# Patient Record
Sex: MALE | Race: BLACK OR AFRICAN AMERICAN | NOT HISPANIC OR LATINO | Employment: UNEMPLOYED | URBAN - METROPOLITAN AREA
[De-identification: names, ages, dates, MRNs, and addresses within clinical notes are randomized per-mention and may not be internally consistent; named-entity substitution may affect disease eponyms.]

---

## 2022-09-01 VITALS
RESPIRATION RATE: 20 BRPM | DIASTOLIC BLOOD PRESSURE: 62 MMHG | OXYGEN SATURATION: 99 % | HEART RATE: 75 BPM | TEMPERATURE: 98.7 F | BODY MASS INDEX: 22.53 KG/M2 | WEIGHT: 185 LBS | HEIGHT: 76 IN | SYSTOLIC BLOOD PRESSURE: 114 MMHG

## 2022-09-01 PROCEDURE — 99281 EMR DPT VST MAYX REQ PHY/QHP: CPT

## 2022-09-02 ENCOUNTER — HOSPITAL ENCOUNTER (EMERGENCY)
Facility: HOSPITAL | Age: 22
Discharge: HOME | End: 2022-09-02
Attending: EMERGENCY MEDICINE | Admitting: EMERGENCY MEDICINE

## 2022-09-02 DIAGNOSIS — S01.112A LACERATION OF LEFT EYEBROW, INITIAL ENCOUNTER: Primary | ICD-10-CM
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
## 2022-09-02 DIAGNOSIS — S09.90XA MINOR HEAD INJURY, INITIAL ENCOUNTER: ICD-10-CM

## 2022-09-02 RX ORDER — LIDOCAINE HYDROCHLORIDE 10 MG/ML
10 INJECTION, SOLUTION EPIDURAL; INFILTRATION; INTRACAUDAL; PERINEURAL ONCE
Status: DISCONTINUED | OUTPATIENT
Start: 2022-09-02 | End: 2022-09-02 | Stop reason: HOSPADM

## 2022-09-02 NOTE — DISCHARGE INSTRUCTIONS
Patient tolerated suture placement well.  I applied #6 sutures to the left eyebrow.  Recommend suture removal in 7 days.  Tylenol/ibuprofen every 4-6 hours as needed for pain.  Keep wound clean and dry with soap and water.  Recommend close PCP follow-up for recheck as needed.  Return to the ER if worsening symptoms of redness, warmth, or drainage.

## 2022-09-02 NOTE — ED PROVIDER NOTES
Subjective   This is a healthy 22-year-old male that presents the ER with laceration to the left eyebrow.  Patient was playing basketball and accidentally head butted another player.  He sustained a laceration to the left eyebrow, but he denies any loss of consciousness.  He denies visual changes or nausea/vomiting.  He denies any dizziness.  Tetanus status is up-to-date.  No other complaints of pain at this time.      History provided by:  Patient and parent  Facial Laceration  Location:  Face  Facial laceration location:  L eyebrow  Length:  1.5cm  Depth:  Cutaneous  Quality: straight    Time since incident:  30 minutes  Injury mechanism: Patient playing basketball and struck another player's head.  Pain details:     Quality:  Throbbing    Severity:  Mild    Timing:  Constant    Progression:  Unchanged  Foreign body present:  No foreign bodies  Relieved by:  Nothing  Worsened by:  Nothing  Ineffective treatments:  None tried  Tetanus status:  Up to date  Associated symptoms: swelling    Associated symptoms: no fever, no focal weakness, no numbness, no rash, no redness and no streaking        Review of Systems   Constitutional: Negative.  Negative for fever.   Eyes: Negative.  Negative for visual disturbance.   Gastrointestinal: Negative.  Negative for nausea and vomiting.   Skin: Positive for wound (Laceration to left eyebrow). Negative for rash.   Neurological: Negative.  Negative for dizziness, focal weakness, syncope and headaches.        No LOC.   All other systems reviewed and are negative.      No past medical history on file.    No Known Allergies    No past surgical history on file.    No family history on file.    Social History     Socioeconomic History   • Marital status: Single           Objective   Physical Exam  Vitals and nursing note reviewed.   Constitutional:       General: He is not in acute distress.     Appearance: Normal appearance.      Comments: Pleasant and talkative.  No acute sign of  pain or distress.   HENT:      Head: Normocephalic. Laceration present.        Right Ear: Tympanic membrane normal.      Left Ear: Tympanic membrane normal.      Nose: Nose normal.      Mouth/Throat:      Mouth: Mucous membranes are moist.      Pharynx: Oropharynx is clear.   Eyes:      Extraocular Movements: Extraocular movements intact.      Right eye: Normal extraocular motion and no nystagmus.      Left eye: Normal extraocular motion and no nystagmus.      Conjunctiva/sclera: Conjunctivae normal.      Pupils: Pupils are equal, round, and reactive to light.   Cardiovascular:      Rate and Rhythm: Normal rate and regular rhythm.  No extrasystoles are present.     Pulses: Normal pulses.      Heart sounds: Normal heart sounds.      Comments: Regular rate and rhythm.  No ectopy.  Pulmonary:      Effort: Pulmonary effort is normal.      Breath sounds: Normal breath sounds.      Comments: Lungs are clear to auscultation bilaterally  Abdominal:      General: Bowel sounds are normal.      Palpations: Abdomen is soft.   Musculoskeletal:         General: Normal range of motion.      Cervical back: Normal range of motion and neck supple. No spinous process tenderness or muscular tenderness.   Skin:     General: Skin is warm and dry.      Findings: Laceration present.      Comments: See HEENT for details on laceration   Neurological:      General: No focal deficit present.      Mental Status: He is alert.      Cranial Nerves: Cranial nerves are intact.      Sensory: Sensation is intact.      Motor: Motor function is intact.      Coordination: Coordination is intact.      Comments: Neuro intact and nonfocal.   Psychiatric:         Mood and Affect: Mood normal.         Speech: Speech normal.         Behavior: Behavior normal. Behavior is cooperative.         Thought Content: Thought content normal.         Cognition and Memory: Cognition normal.         Judgment: Judgment normal.      Comments: Pleasant and talkative.  Smiling  and laughing.         Laceration Repair    Date/Time: 9/2/2022 3:16 AM  Performed by: Abbie Bradley PA-C  Authorized by: Shilo Chowdhury MD     Consent:     Consent obtained:  Verbal    Consent given by:  Patient    Risks, benefits, and alternatives were discussed: yes      Risks discussed:  Infection, poor cosmetic result and poor wound healing  Universal protocol:     Patient identity confirmed:  Verbally with patient  Anesthesia:     Anesthesia method:  Local infiltration    Local anesthetic:  Lidocaine 1% w/o epi  Laceration details:     Location:  Face    Face location:  L eyebrow    Length (cm):  1.5    Depth (mm):  3  Pre-procedure details:     Preparation:  Patient was prepped and draped in usual sterile fashion  Exploration:     Limited defect created (wound extended): no      Imaging outcome: foreign body not noted      Wound extent: no foreign bodies/material noted      Contaminated: no    Treatment:     Area cleansed with:  Povidone-iodine and saline    Amount of cleaning:  Standard    Irrigation solution:  Sterile saline    Irrigation method: 4x4s.    Visualized foreign bodies/material removed: no      Debridement:  None    Undermining:  Minimal    Scar revision: no    Skin repair:     Repair method:  Sutures    Suture size:  6-0    Suture material:  Nylon    Suture technique:  Simple interrupted    Number of sutures:  6  Approximation:     Approximation:  Close  Repair type:     Repair type:  Complex  Post-procedure details:     Dressing:  Open (no dressing)    Procedure completion:  Tolerated well, no immediate complications               ED Course  ED Course as of 09/02/22 0319   Fri Sep 02, 2022   0318 Patient tolerated suture placement well.  I applied #6 sutures and recommend suture removal in 7 days.  Tetanus status is up-to-date.  Keep wound clean with soap and water and open to the air is much as possible.  Return to the ER if any worsening symptoms of redness, warmth, or drainage. [FC]  "     ED Course User Index  [FC] Abbie Bradley PA-C                 No results found for this or any previous visit (from the past 24 hour(s)).  Note: In addition to lab results from this visit, the labs listed above may include labs taken at another facility or during a different encounter within the last 24 hours. Please correlate lab times with ED admission and discharge times for further clarification of the services performed during this visit.    No orders to display     Vitals:    09/01/22 2201   BP: (!) 114/62   BP Location: Right arm   Patient Position: Sitting   Pulse: (!) 75   Resp: (!) 20   Temp: 98.7 °F (37.1 °C)   TempSrc: Oral   SpO2: 99%   Weight: 83.9 kg (185 lb)   Height: 193 cm (76\")     Medications   lidocaine PF 1% (XYLOCAINE) injection 10 mL (0 mL Infiltration Hold 9/2/22 0319)     ECG/EMG Results (last 24 hours)     ** No results found for the last 24 hours. **        No orders to display                                  MDM    Final diagnoses:   Laceration of left eyebrow, initial encounter   Injury while playing basketball   Minor head injury, initial encounter       ED Disposition  ED Disposition     ED Disposition   Discharge    Condition   Stable    Comment   --             [facility] Emergency Department  [address] [id number] [phone]    If symptoms worsen         Medication List      No changes were made to your prescriptions during this visit.          Abbie Bradley PA-C  09/02/22 0319    "